# Patient Record
Sex: FEMALE | Race: WHITE | URBAN - METROPOLITAN AREA
[De-identification: names, ages, dates, MRNs, and addresses within clinical notes are randomized per-mention and may not be internally consistent; named-entity substitution may affect disease eponyms.]

---

## 2018-01-07 ENCOUNTER — TRANSFERRED RECORDS (OUTPATIENT)
Dept: HEALTH INFORMATION MANAGEMENT | Facility: CLINIC | Age: 43
End: 2018-01-07

## 2018-01-10 ENCOUNTER — TRANSFERRED RECORDS (OUTPATIENT)
Dept: HEALTH INFORMATION MANAGEMENT | Facility: CLINIC | Age: 43
End: 2018-01-10

## 2018-01-11 ENCOUNTER — TRANSFERRED RECORDS (OUTPATIENT)
Dept: HEALTH INFORMATION MANAGEMENT | Facility: CLINIC | Age: 43
End: 2018-01-11

## 2018-07-31 ENCOUNTER — TRANSFERRED RECORDS (OUTPATIENT)
Dept: HEALTH INFORMATION MANAGEMENT | Facility: CLINIC | Age: 43
End: 2018-07-31

## 2019-12-09 ENCOUNTER — TRANSFERRED RECORDS (OUTPATIENT)
Dept: HEALTH INFORMATION MANAGEMENT | Facility: CLINIC | Age: 44
End: 2019-12-09

## 2020-01-08 ENCOUNTER — TRANSFERRED RECORDS (OUTPATIENT)
Dept: HEALTH INFORMATION MANAGEMENT | Facility: CLINIC | Age: 45
End: 2020-01-08

## 2020-02-21 ENCOUNTER — TRANSCRIBE ORDERS (OUTPATIENT)
Dept: OTHER | Age: 45
End: 2020-02-21

## 2020-02-21 DIAGNOSIS — G03.9 MENINGITIS: Primary | ICD-10-CM

## 2020-02-27 ENCOUNTER — PRE VISIT (OUTPATIENT)
Dept: NEUROLOGY | Facility: CLINIC | Age: 45
End: 2020-02-27

## 2020-02-27 NOTE — TELEPHONE ENCOUNTER
FUTURE VISIT INFORMATION      FUTURE VISIT INFORMATION:    Date: 3/25/2020    Time: 4pm    Location: [facility] [place] [facility]  REFERRAL INFORMATION:    Referring provider:  Dr. Ferreira     Referring providers clinic:  [facility]     Reason for visit/diagnosis  Meningitis     RECORDS REQUESTED FROM:       Clinic name Comments Records Status Imaging Status   [facility]  Dr. Ferreira -2/13/2020 Care Everywhere Requested all Head/Brain/Spine Imaging [id number]

## 2020-03-22 ENCOUNTER — TRANSFERRED RECORDS (OUTPATIENT)
Dept: HEALTH INFORMATION MANAGEMENT | Facility: CLINIC | Age: 45
End: 2020-03-22

## 2020-03-25 ENCOUNTER — OFFICE VISIT (OUTPATIENT)
Dept: NEUROLOGY | Facility: CLINIC | Age: 45
End: 2020-03-25

## 2020-03-25 VITALS
OXYGEN SATURATION: 97 % | WEIGHT: 157 LBS | SYSTOLIC BLOOD PRESSURE: 127 MMHG | DIASTOLIC BLOOD PRESSURE: 84 MMHG | HEART RATE: 85 BPM

## 2020-03-25 DIAGNOSIS — T50.905A ASEPTIC MENINGITIS DUE TO DRUG: Primary | ICD-10-CM

## 2020-03-25 DIAGNOSIS — G03.0 ASEPTIC MENINGITIS DUE TO DRUG: Primary | ICD-10-CM

## 2020-03-25 RX ORDER — LANSOPRAZOLE 30 MG/1
30 CAPSULE, DELAYED RELEASE ORAL DAILY
COMMUNITY

## 2020-03-25 RX ORDER — HYDROXYCHLOROQUINE SULFATE 200 MG/1
200 TABLET, FILM COATED ORAL DAILY
COMMUNITY
Start: 2020-01-03

## 2020-03-25 SDOH — HEALTH STABILITY: MENTAL HEALTH: HOW OFTEN DO YOU HAVE A DRINK CONTAINING ALCOHOL?: 2-3 TIMES A WEEK

## 2020-03-25 ASSESSMENT — PAIN SCALES - GENERAL: PAINLEVEL: NO PAIN (0)

## 2020-03-25 NOTE — LETTER
3/25/2020       RE: Karolina Marie  [address]  [facility] [address]     Dear Colleague,    Thank you for referring your patient, Karolina Marie, to the [facility] NEUROLOGY at [facility]. Please see a copy of my visit note below.    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  She is followed by rheumatology for systemic lupus erythematosus (Dx 2007 (+MITZI< RNP, Nguyen)) and is on long term immunosuppression with Azathioprine and plaquenil. Azathioprine stopped in 1/2020.  The patient has had recurrent admissions for headache, fever, nuchal rigidity, myalgia since 2016 as is documented in short below (please see data section for complete values);  1. 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 Admission: 1/7/2018: CSF - suggestive for PMN elevation initially and then repeat a week later was lymphocytic, aseptic  - presented with fever (102.5), nausea, myalgia, occipital headache, nuchal rigidity.  - MRI, lumbar puncture, PCR panel from CSF  3. Admission: 12/8/2019 through 12/11/2019: CSF - suggestive for bacterial initially, aseptic in culture  - presented with 5 days headache (103), fever 103, and neck pain.    During 2019 admission, she had neurology consultation as well as follow up with another  "neurologist, Dr. Jett Ferreira, on 12/30/2019 and 2/13/2020.  Impression following these visits after unrevealing lumbar MRI, and return visit with rheumatology, were not necessarily supportive for plaquenil induced, azithioprine induced, or primary SLE caused aseptic meningitis.  The leading thought was that the patient has recurrent aseptic meningitis (however no HSV2 positive tests were ever seen, HSV-2 is associated with this diagnosis 80% of the time).  It is reported during her consultations, that she takes NSAIDs up to 4 times per month.    She takes ibuprofen three times a week at this time, and can switch between this and aleve and tylenol.  She takes it primarily for headache and muscle aches.  She gets a headache once a week and has frontal banding pattern.  No visual symptoms, no aura, no rising sensation, no atypical smell occurs.  She doesn't report any cardiac issues at this time (no atypical flutter, pain in chest, or referred pain into the arms). She doesn't report any IV drug use.    Childhood epilepsy was described as occurring at 4th grade and lasting until 7th grade.  The patient indicates her mother's report in the past was of a diagnosis of \"Childhood seizure disorder\".  She was on Tegretol for these.  She had speech impairment, numbness in her face, and aphasia.  She doesn't report any childhood or adolescent delays, no issues with delivery.  No major head trauma. No history of febrile seizures. No meningitis or encephalitis as a child.      Past Medical History:   - SLE  - IgG4 deficiency  - childhood seizure disorder (by Mother's report alone)     Past Surgical History:   Tonsillectomy and appendix removal     Social History:   CT technologist. Doesn't smoke. Drinking 1 a week. No illicit substances. , no children, 1 dog (chocolate lab).     Family History:   Father - HTN  Mother - prediabetic, high cholesterol     Medications:   Plaquenil 200 mg every day  Prevacid 30 mg QD     " Allergies:   No major allergies (pheonbarbital led to hives)     Review of Systems:   A comprehensive 10 point review of systems (constitutional, ENT, cardiac, peripheral vascular, lymphatic, respiratory, GI, , Musculoskeletal, skin, Neurological) was performed and found to be negative except as described in this note.      Physical Exam:   Vitals: /84   Pulse 85   Wt 71.2 kg (157 lb)   SpO2 97%    General: Seated comfortably in no acute distress.  HEENT: Neck supple with normal range of motion. No paracervical muscle tenderness or tightness.  Optic discs sharp and vasculature normal on funduscopic exam.   Heart: Regular rate  Lungs: breathing comfortably  GI: Non tender  Extremities: no edema  Skin: No rashes  Neurologic:     Mental Status: Fully alert, attentive and oriented. Speech clear and fluent, no paraphasic errors. MiniCog score of 3/5 (rememberd table, noemy clock numbers outside of clock and improper hand placement). Only named 6 words that start with F in one minute, no repeats. Copies cube correctly. Repeats sentences correctly and without insertional errors. Serial 7s without error. Luria testing negative.      Cranial Nerves: Visual fields intact. PERRL. EOMI with normal smooth pursuit, no saccadic intrusion or ocular motor apraxia. Facial sensation intact/symmetric. Facial movements symmetric. Hearing not formally tested but intact to conversation. Palate elevation symmetric, uvula midline. No dysarthria. Shoulder shrug strong bilaterally. Tongue protrusion midline.     Motor: No tremors or other abnormal movements observed. Muscle tone normal throughout. No pronator drift. Normal/symmetric rapid finger tapping. Strength 5/5 throughout upper and lower extremities.     Deep Tendon Reflexes: 1+/symmetric throughout upper and lower extremities (diffuses hyporeflexic). No clonus. Toes downgoing bilaterally.     Sensory: Intact/symmetric to light touch, pinprick, temperature, vibration and  proprioception throughout upper and lower extremities. Negative Romberg.      Coordination: Finger-nose-finger and heel-shin intact without dysmetria. Rapid alternating movements intact/symmetric with normal speed and rhythm.     Gait: Normal, steady casual gait. Able to walk on toes, heels and tandem without difficulty.         Data: Pertinent prior to visit   Imaging:  MRI lumbar spine: 1/8/2020  1. No abnormal spinal cord or cauda equina nerve root enhancement.  2. No significant spinal canal stenosis or neural foraminal narrowing.    MRI brain: 7/31/2018 (w/wout contrast)  1.  No change when compared to examination of 01/07/2018.  2.  No evidence of acute infarction.  3.  No evidence of intracranial mass.    MRI brain: 1/7/2018  - Normal diffusion. Normal T2 and FLAIR signal within the brain parenchyma. The ventricular system, sulci, and cisterns are normal caliber and configuration. Normal flow voids within the major intracranial vessels. Normal enhancement. The visualized calvarium, paranasal sinuses, skull base, and upper cervical spine are unremarkable.    MRI brain: 12/12/2016  1.  Comparison is made to examination of 11/09/2016.  2.  Interval resolution of the previously identified signal abnormality within the dorsal medulla at the pontomedullary junction.    MRV head: 11/13/2016  1. Moderate stenosis involving the superior sagittal sinus posteriorly without evidence for intraluminal thrombus. Remainder of the superior sagittal sinus is patent.  2. The transverse sinuses, sigmoid sinuses, and straight sinus are patent and nonstenotic.     MRA: 11/13/2016  No evidence of intracranial aneurysm or stenosis.    MRI brain: 11/9/2016  1. Focal zone of T2 and FLAIR weighted hyperintensity with patchy enhancement involving the dorsal medulla bilaterally left greater than right. Findings could be consistent with demyelinating disease. Recommend 1 month interval follow-up MRI of the brain without and with contrast  to assess for resolution of enhancement and to exclude a more aggressive process such as a low-grade glioma. Capillary telangiectasia less likely given degree of FLAIR weighted hyperintensity.  2. No evidence for acute infarction.    Laboratory:  12/10/2019:  MITZI +, dsDNA + (but titer is <1:10)  Rh, TPO, SMITH/RNP, Scl-70, GEENA, SSB, SSA - negative    2/24/2017  Leukemia/lymphoma panel: normal    Micro:  11/2016:  Cryptococcal antigen - negative    12/9/2019  HIV, treponema, HSV1    CSF:  12/8/2019: cloudy, colorless  WBC: 3400, neurotrophils (94%). 0 RBC  - glucose 40, protein 110, PCR panel negative - Enterovirus, HSV PCR)  -Viral panel: normal (E.coli, H.influenae, listeria, Neisseria meningitidis, step agalactia, strep pneumonia, CMV, HSV1, HSV2, HHV6, VZV, cryptococcus)  - CSF testing not done (arbovirus panel, crypto antigen) ~ lack of CSF     1/10/2018: bloody  , 62 % lymphocytes. 05430 RBC  - Glucose 54, protein 92    1/7/2018: Cloudy  WBC 1459, 92%PMN. 63422 RBC.  - Glucose 42, protein 93     11/13/2016: Clear, colorless  WBC 44, 88% lymphocytes. 2 RBC  - Glucose 52, Protein 29    11/9/2016: Colorless, clear  , 67% PMN. 0 RBC  - Glucose 45, Protein 63  ACE 2.0 (normal)    CSF cultures/micro:  1/11/2018, 1/7/2018, 11/9/2016  - fungal and stain, AFB all negative  - CSF meningitis/encephalitis PCR panel negative (includes HSV1, HSV2, HHV6)  - all cultures otherwise w/no growth         Assessment and Plan:   Assessment:  - Recurrent aseptic meningitis    The patient has recurrent aseptic meningitis that is almost stereotyped in presentation (onset, duration, symptoms) with PMN counts that are elevated and become lymphocytic over time.  She has no evidence for atypical vascular or cystic structures in imaging, and has large and detailed w/up with CSF and serum testing over the last 3 episodes.  I do not suspect that IgG4 deficiency would play into her meningitis to a great extent, given that no  viral culture or test has ever returned positive.  If IgG4 deficiency led to increased viral infections, then I would have expected to see a positive result at some point.  It is possible that viral cultures or testing missed the infectious etiology due to poor specificity.  I do not think SLE itself would cause an aseptic presentation such as the patient is experiencing, as generally this is thought to be more of a cerebritis or vascular inflammatory process, which wasn't seen in 2016 with MRA or with other inflammatory laboratory testing.  Whether the patient has an autoimmune response occurring from previous viral infection 2-4 weeks before symptom onset that then spills over into the central nervous system due to poor/leaky blood brain barrier due to SLE remains to be seen and would be difficult to prove.  My best guess at this time for an etiology would be chemical/drug induced aseptic meningitis.  People with Lupus have a greater prediliction towards having NSAID and specifically ibuprofen induced aseptic meningitis, and the patient takes ibuprofen about 2-3 times per week.  I am uncertain as to why she would develop the meningitis at sporadic times, but testing this would be rather simple.  I recommend the patient try to not take ibuprofen or NSAIDS over the next year if possible.  If the meningitis returns, then this drug induced hypothesis would be ruled out and she could potentially benefit from other therapeutic options for recurrent aseptic meningitis.  I would also recommend the patient have an immunology w/up through whatever health system she feels comfortable with, for further identification for her IgG4 deficiency.      On other notes, she had lowered reflexes throughout her exam and she should have her TSH checked in the near future.  She also tested poorly with sporadic language production and memory.  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  In any case, the cognitive testing should be repeated with her PCP or neurologist, and possibly expanded with a neuropsychology evaluation in the future pending repeat results.     Plan:  - stop NSAIDs  - obtain TSH through PCP  - repeat cognitive testing through PCP or neurologist  - Try to obtain an immunology consultation    Follow up in Neurology clinic as needed should new concerns arise.    CONCHA Dee D.O.   of Neurology    Greater than 50% of the total time (80 min) in this patient encounter was spent on counseling and/or coordination of care. We reviewed diagnostic results, impressions, and discussed other possible tests if symptoms do not improve. We discussed the implications of the diagnosis, as well as risks and benefits of management options. We reviewed treatment instructions and our scheduled follow-up as specified in the discharge plan. We also discussed the importance of compliance with the chosen course of treatment. The patient is in agreement with this plan and has no further questions.      Again, thank you for allowing me to participate in the care of your patient.      Sincerely,    Aiden Dee, DO

## 2020-03-25 NOTE — NURSING NOTE
Chief Complaint   Patient presents with     Consult     [facility] NEW - RECURRENT MENINGITIS     Leticia Loza, EMT

## 2020-03-25 NOTE — PROGRESS NOTES
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  She is followed by rheumatology for systemic lupus erythematosus (Dx 2007 (+MITZI< RNP, Nguyen)) and is on long term immunosuppression with Azathioprine and plaquenil. Azathioprine stopped in 1/2020.  The patient has had recurrent admissions for headache, fever, nuchal rigidity, myalgia since 2016 as is documented in short below (please see data section for complete values);  1. 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 Admission: 1/7/2018: CSF - suggestive for PMN elevation initially and then repeat a week later was lymphocytic, aseptic  - presented with fever (102.5), nausea, myalgia, occipital headache, nuchal rigidity.  - MRI, lumbar puncture, PCR panel from CSF  3. Admission: 12/8/2019 through 12/11/2019: CSF - suggestive for bacterial initially, aseptic in culture  - presented with 5 days headache (103), fever 103, and neck pain.    During 2019 admission, she had neurology consultation as well as follow up with another neurologist, Dr. Jett Ferreira, on 12/30/2019 and 2/13/2020.  Impression following these visits after unrevealing lumbar MRI, and return visit with rheumatology, were not necessarily supportive for plaquenil induced, azithioprine induced, or primary SLE caused aseptic meningitis.  " The leading thought was that the patient has recurrent aseptic meningitis (however no HSV2 positive tests were ever seen, HSV-2 is associated with this diagnosis 80% of the time).  It is reported during her consultations, that she takes NSAIDs up to 4 times per month.    She takes ibuprofen three times a week at this time, and can switch between this and aleve and tylenol.  She takes it primarily for headache and muscle aches.  She gets a headache once a week and has frontal banding pattern.  No visual symptoms, no aura, no rising sensation, no atypical smell occurs.  She doesn't report any cardiac issues at this time (no atypical flutter, pain in chest, or referred pain into the arms). She doesn't report any IV drug use.    Childhood epilepsy was described as occurring at 4th grade and lasting until 7th grade.  The patient indicates her mother's report in the past was of a diagnosis of \"Childhood seizure disorder\".  She was on Tegretol for these.  She had speech impairment, numbness in her face, and aphasia.  She doesn't report any childhood or adolescent delays, no issues with delivery.  No major head trauma. No history of febrile seizures. No meningitis or encephalitis as a child.      Past Medical History:   - SLE  - IgG4 deficiency  - childhood seizure disorder (by Mother's report alone)     Past Surgical History:   Tonsillectomy and appendix removal     Social History:   CT technologist. Doesn't smoke. Drinking 1 a week. No illicit substances. , no children, 1 dog (chocolate lab).     Family History:   Father - HTN  Mother - prediabetic, high cholesterol     Medications:   Plaquenil 200 mg every day  Prevacid 30 mg QD     Allergies:   No major allergies (pheonbarbital led to hives)     Review of Systems:   A comprehensive 10 point review of systems (constitutional, ENT, cardiac, peripheral vascular, lymphatic, respiratory, GI, , Musculoskeletal, skin, Neurological) was performed and found to be " negative except as described in this note.      Physical Exam:   Vitals: /84   Pulse 85   Wt 71.2 kg (157 lb)   SpO2 97%    General: Seated comfortably in no acute distress.  HEENT: Neck supple with normal range of motion. No paracervical muscle tenderness or tightness.  Optic discs sharp and vasculature normal on funduscopic exam.   Heart: Regular rate  Lungs: breathing comfortably  GI: Non tender  Extremities: no edema  Skin: No rashes  Neurologic:     Mental Status: Fully alert, attentive and oriented. Speech clear and fluent, no paraphasic errors. MiniCog score of 3/5 (rememberd table, noemy clock numbers outside of clock and improper hand placement). Only named 6 words that start with F in one minute, no repeats. Copies cube correctly. Repeats sentences correctly and without insertional errors. Serial 7s without error. Luria testing negative.      Cranial Nerves: Visual fields intact. PERRL. EOMI with normal smooth pursuit, no saccadic intrusion or ocular motor apraxia. Facial sensation intact/symmetric. Facial movements symmetric. Hearing not formally tested but intact to conversation. Palate elevation symmetric, uvula midline. No dysarthria. Shoulder shrug strong bilaterally. Tongue protrusion midline.     Motor: No tremors or other abnormal movements observed. Muscle tone normal throughout. No pronator drift. Normal/symmetric rapid finger tapping. Strength 5/5 throughout upper and lower extremities.     Deep Tendon Reflexes: 1+/symmetric throughout upper and lower extremities (diffuses hyporeflexic). No clonus. Toes downgoing bilaterally.     Sensory: Intact/symmetric to light touch, pinprick, temperature, vibration and proprioception throughout upper and lower extremities. Negative Romberg.      Coordination: Finger-nose-finger and heel-shin intact without dysmetria. Rapid alternating movements intact/symmetric with normal speed and rhythm.     Gait: Normal, steady casual gait. Able to walk on toes,  heels and tandem without difficulty.         Data: Pertinent prior to visit   Imaging:  MRI lumbar spine: 1/8/2020  1. No abnormal spinal cord or cauda equina nerve root enhancement.  2. No significant spinal canal stenosis or neural foraminal narrowing.    MRI brain: 7/31/2018 (w/wout contrast)  1.  No change when compared to examination of 01/07/2018.  2.  No evidence of acute infarction.  3.  No evidence of intracranial mass.    MRI brain: 1/7/2018  - Normal diffusion. Normal T2 and FLAIR signal within the brain parenchyma. The ventricular system, sulci, and cisterns are normal caliber and configuration. Normal flow voids within the major intracranial vessels. Normal enhancement. The visualized calvarium, paranasal sinuses, skull base, and upper cervical spine are unremarkable.    MRI brain: 12/12/2016  1.  Comparison is made to examination of 11/09/2016.  2.  Interval resolution of the previously identified signal abnormality within the dorsal medulla at the pontomedullary junction.    MRV head: 11/13/2016  1. Moderate stenosis involving the superior sagittal sinus posteriorly without evidence for intraluminal thrombus. Remainder of the superior sagittal sinus is patent.  2. The transverse sinuses, sigmoid sinuses, and straight sinus are patent and nonstenotic.     MRA: 11/13/2016  No evidence of intracranial aneurysm or stenosis.    MRI brain: 11/9/2016  1. Focal zone of T2 and FLAIR weighted hyperintensity with patchy enhancement involving the dorsal medulla bilaterally left greater than right. Findings could be consistent with demyelinating disease. Recommend 1 month interval follow-up MRI of the brain without and with contrast to assess for resolution of enhancement and to exclude a more aggressive process such as a low-grade glioma. Capillary telangiectasia less likely given degree of FLAIR weighted hyperintensity.  2. No evidence for acute infarction.    Laboratory:  12/10/2019:  MITZI +, dsDNA + (but titer  is <1:10)  Rh, TPO, SMITH/RNP, Scl-70, GEENA, SSB, SSA - negative    2/24/2017  Leukemia/lymphoma panel: normal    Micro:  11/2016:  Cryptococcal antigen - negative    12/9/2019  HIV, treponema, HSV1    CSF:  12/8/2019: cloudy, colorless  WBC: 3400, neurotrophils (94%). 0 RBC  - glucose 40, protein 110, PCR panel negative - Enterovirus, HSV PCR)  -Viral panel: normal (E.coli, H.influenae, listeria, Neisseria meningitidis, step agalactia, strep pneumonia, CMV, HSV1, HSV2, HHV6, VZV, cryptococcus)  - CSF testing not done (arbovirus panel, crypto antigen) ~ lack of CSF     1/10/2018: bloody  , 62 % lymphocytes. 63192 RBC  - Glucose 54, protein 92    1/7/2018: Cloudy  WBC 1459, 92%PMN. 10043 RBC.  - Glucose 42, protein 93     11/13/2016: Clear, colorless  WBC 44, 88% lymphocytes. 2 RBC  - Glucose 52, Protein 29    11/9/2016: Colorless, clear  , 67% PMN. 0 RBC  - Glucose 45, Protein 63  ACE 2.0 (normal)    CSF cultures/micro:  1/11/2018, 1/7/2018, 11/9/2016  - fungal and stain, AFB all negative  - CSF meningitis/encephalitis PCR panel negative (includes HSV1, HSV2, HHV6)  - all cultures otherwise w/no growth         Assessment and Plan:   Assessment:  - Recurrent aseptic meningitis    The patient has recurrent aseptic meningitis that is almost stereotyped in presentation (onset, duration, symptoms) with PMN counts that are elevated and become lymphocytic over time.  She has no evidence for atypical vascular or cystic structures in imaging, and has large and detailed w/up with CSF and serum testing over the last 3 episodes.  I do not suspect that IgG4 deficiency would play into her meningitis to a great extent, given that no viral culture or test has ever returned positive.  If IgG4 deficiency led to increased viral infections, then I would have expected to see a positive result at some point.  It is possible that viral cultures or testing missed the infectious etiology due to poor specificity.  I do not think  SLE itself would cause an aseptic presentation such as the patient is experiencing, as generally this is thought to be more of a cerebritis or vascular inflammatory process, which wasn't seen in 2016 with MRA or with other inflammatory laboratory testing.  Whether the patient has an autoimmune response occurring from previous viral infection 2-4 weeks before symptom onset that then spills over into the central nervous system due to poor/leaky blood brain barrier due to SLE remains to be seen and would be difficult to prove.  My best guess at this time for an etiology would be chemical/drug induced aseptic meningitis.  People with Lupus have a greater prediliction towards having NSAID and specifically ibuprofen induced aseptic meningitis, and the patient takes ibuprofen about 2-3 times per week.  I am uncertain as to why she would develop the meningitis at sporadic times, but testing this would be rather simple.  I recommend the patient try to not take ibuprofen or NSAIDS over the next year if possible.  If the meningitis returns, then this drug induced hypothesis would be ruled out and she could potentially benefit from other therapeutic options for recurrent aseptic meningitis.  I would also recommend the patient have an immunology w/up through whatever health system she feels comfortable with, for further identification for her IgG4 deficiency.      On other notes, she had lowered reflexes throughout her exam and she should have her TSH checked in the near future.  She also tested poorly with sporadic language production and memory.  I am uncertain if this is due to anxiety with testing, or if it a remnant cognitive deficit from her childhood epilepsy (which sounds like mild-Landau Kleffner syndrome).  In any case, the cognitive testing should be repeated with her PCP or neurologist, and possibly expanded with a neuropsychology evaluation in the future pending repeat results.     Plan:  - stop NSAIDs  - obtain TSH  through PCP  - repeat cognitive testing through PCP or neurologist  - Try to obtain an immunology consultation    Follow up in Neurology clinic as needed should new concerns arise.    CONCHA Dee D.O.   of Neurology    Greater than 50% of the total time (80 min) in this patient encounter was spent on counseling and/or coordination of care. We reviewed diagnostic results, impressions, and discussed other possible tests if symptoms do not improve. We discussed the implications of the diagnosis, as well as risks and benefits of management options. We reviewed treatment instructions and our scheduled follow-up as specified in the discharge plan. We also discussed the importance of compliance with the chosen course of treatment. The patient is in agreement with this plan and has no further questions.

## 2020-03-25 NOTE — PATIENT INSTRUCTIONS
You may have a drug induced aseptic meningitis relating to NSAID use.  I would recommend trying to stop taking ibuprofen or other agents like it.    - Stop ibuprofen and other NSAIDS.   - If you have another event while off of these mediations, your meningitis is unlikely due to chemicals    Your memory and language testing was somewhat lower scoring than expected.  I am unsure if this is something new or something you've lived with your entire life.  However, your history of seizures with language deficits is concerning enough that you should repeat memory and language testing with your PCP or neurologist in the future and consider neuropsychological testing.    You had diminished reflexes throughout exam.  This may be a normal for you, but I would recommend you discuss with your PCP about having a TSH test.

## 2021-01-04 ENCOUNTER — HEALTH MAINTENANCE LETTER (OUTPATIENT)
Age: 46
End: 2021-01-04

## 2021-10-10 ENCOUNTER — HEALTH MAINTENANCE LETTER (OUTPATIENT)
Age: 46
End: 2021-10-10

## 2022-01-30 ENCOUNTER — HEALTH MAINTENANCE LETTER (OUTPATIENT)
Age: 47
End: 2022-01-30

## 2022-09-18 ENCOUNTER — HEALTH MAINTENANCE LETTER (OUTPATIENT)
Age: 47
End: 2022-09-18

## 2023-01-29 ENCOUNTER — HEALTH MAINTENANCE LETTER (OUTPATIENT)
Age: 48
End: 2023-01-29

## 2023-05-08 ENCOUNTER — HEALTH MAINTENANCE LETTER (OUTPATIENT)
Age: 48
End: 2023-05-08